# Patient Record
Sex: FEMALE | Race: WHITE
[De-identification: names, ages, dates, MRNs, and addresses within clinical notes are randomized per-mention and may not be internally consistent; named-entity substitution may affect disease eponyms.]

---

## 2018-01-30 ENCOUNTER — HOSPITAL ENCOUNTER (INPATIENT)
Dept: HOSPITAL 17 - NEPD | Age: 53
LOS: 7 days | Discharge: HOME | DRG: 885 | End: 2018-02-06
Attending: PSYCHIATRY & NEUROLOGY | Admitting: PSYCHIATRY & NEUROLOGY
Payer: MEDICAID

## 2018-01-30 VITALS — BODY MASS INDEX: 27.66 KG/M2 | HEIGHT: 63 IN | WEIGHT: 156.09 LBS

## 2018-01-30 VITALS
TEMPERATURE: 98.2 F | DIASTOLIC BLOOD PRESSURE: 105 MMHG | RESPIRATION RATE: 18 BRPM | HEART RATE: 97 BPM | SYSTOLIC BLOOD PRESSURE: 126 MMHG | OXYGEN SATURATION: 98 %

## 2018-01-30 VITALS
SYSTOLIC BLOOD PRESSURE: 123 MMHG | RESPIRATION RATE: 18 BRPM | TEMPERATURE: 98.4 F | DIASTOLIC BLOOD PRESSURE: 66 MMHG | HEART RATE: 88 BPM

## 2018-01-30 VITALS
HEART RATE: 89 BPM | RESPIRATION RATE: 18 BRPM | SYSTOLIC BLOOD PRESSURE: 128 MMHG | DIASTOLIC BLOOD PRESSURE: 75 MMHG | OXYGEN SATURATION: 97 %

## 2018-01-30 DIAGNOSIS — F17.210: ICD-10-CM

## 2018-01-30 DIAGNOSIS — E78.5: ICD-10-CM

## 2018-01-30 DIAGNOSIS — Z79.51: ICD-10-CM

## 2018-01-30 DIAGNOSIS — F20.0: Primary | ICD-10-CM

## 2018-01-30 DIAGNOSIS — J45.909: ICD-10-CM

## 2018-01-30 DIAGNOSIS — Z81.8: ICD-10-CM

## 2018-01-30 DIAGNOSIS — N32.81: ICD-10-CM

## 2018-01-30 DIAGNOSIS — Z62.810: ICD-10-CM

## 2018-01-30 DIAGNOSIS — Z79.899: ICD-10-CM

## 2018-01-30 LAB
ALBUMIN SERPL-MCNC: 4.6 GM/DL (ref 3.4–5)
ALP SERPL-CCNC: 88 U/L (ref 45–117)
ALT SERPL-CCNC: 26 U/L (ref 10–53)
AST SERPL-CCNC: 21 U/L (ref 15–37)
BASOPHILS # BLD AUTO: 0 TH/MM3 (ref 0–0.2)
BASOPHILS NFR BLD: 0.4 % (ref 0–2)
BILIRUB SERPL-MCNC: 0.7 MG/DL (ref 0.2–1)
BUN SERPL-MCNC: 9 MG/DL (ref 7–18)
CALCIUM SERPL-MCNC: 8.8 MG/DL (ref 8.5–10.1)
CHLORIDE SERPL-SCNC: 106 MEQ/L (ref 98–107)
CREAT SERPL-MCNC: 1.02 MG/DL (ref 0.5–1)
EOSINOPHIL # BLD: 0 TH/MM3 (ref 0–0.4)
EOSINOPHIL NFR BLD: 0.6 % (ref 0–4)
ERYTHROCYTE [DISTWIDTH] IN BLOOD BY AUTOMATED COUNT: 13.4 % (ref 11.6–17.2)
GFR SERPLBLD BASED ON 1.73 SQ M-ARVRAT: 57 ML/MIN (ref 89–?)
GLUCOSE SERPL-MCNC: 121 MG/DL (ref 74–106)
HCO3 BLD-SCNC: 23.5 MEQ/L (ref 21–32)
HCT VFR BLD CALC: 44.7 % (ref 35–46)
HGB BLD-MCNC: 15.2 GM/DL (ref 11.6–15.3)
LYMPHOCYTES # BLD AUTO: 2.1 TH/MM3 (ref 1–4.8)
LYMPHOCYTES NFR BLD AUTO: 26.1 % (ref 9–44)
MCH RBC QN AUTO: 31.9 PG (ref 27–34)
MCHC RBC AUTO-ENTMCNC: 33.9 % (ref 32–36)
MCV RBC AUTO: 93.9 FL (ref 80–100)
MONOCYTE #: 0.7 TH/MM3 (ref 0–0.9)
MONOCYTES NFR BLD: 9.2 % (ref 0–8)
NEUTROPHILS # BLD AUTO: 5 TH/MM3 (ref 1.8–7.7)
NEUTROPHILS NFR BLD AUTO: 63.7 % (ref 16–70)
PLATELET # BLD: 245 TH/MM3 (ref 150–450)
PMV BLD AUTO: 9.1 FL (ref 7–11)
PROT SERPL-MCNC: 8.7 GM/DL (ref 6.4–8.2)
RBC # BLD AUTO: 4.76 MIL/MM3 (ref 4–5.3)
SODIUM SERPL-SCNC: 140 MEQ/L (ref 136–145)
WBC # BLD AUTO: 7.9 TH/MM3 (ref 4–11)

## 2018-01-30 PROCEDURE — 80307 DRUG TEST PRSMV CHEM ANLYZR: CPT

## 2018-01-30 PROCEDURE — 84100 ASSAY OF PHOSPHORUS: CPT

## 2018-01-30 PROCEDURE — 83036 HEMOGLOBIN GLYCOSYLATED A1C: CPT

## 2018-01-30 PROCEDURE — 84443 ASSAY THYROID STIM HORMONE: CPT

## 2018-01-30 PROCEDURE — 86592 SYPHILIS TEST NON-TREP QUAL: CPT

## 2018-01-30 PROCEDURE — 84550 ASSAY OF BLOOD/URIC ACID: CPT

## 2018-01-30 PROCEDURE — 82607 VITAMIN B-12: CPT

## 2018-01-30 PROCEDURE — 82550 ASSAY OF CK (CPK): CPT

## 2018-01-30 PROCEDURE — 99285 EMERGENCY DEPT VISIT HI MDM: CPT

## 2018-01-30 PROCEDURE — 85025 COMPLETE CBC W/AUTO DIFF WBC: CPT

## 2018-01-30 PROCEDURE — 83735 ASSAY OF MAGNESIUM: CPT

## 2018-01-30 PROCEDURE — 80061 LIPID PANEL: CPT

## 2018-01-30 PROCEDURE — 80053 COMPREHEN METABOLIC PANEL: CPT

## 2018-01-30 PROCEDURE — 82306 VITAMIN D 25 HYDROXY: CPT

## 2018-01-30 NOTE — PD
HPI


Chief Complaint:  psychiatric


Time Seen by Provider:  15:25


Travel History


International Travel<30 days:  No


Contact w/Intl Traveler<30days:  No


Traveled to known affect area:  No





History of Present Illness


HPI


52-year-old female requesting psychiatric evaluation.  Patient has history of 

schizophrenia.  Patient states that she has not taken her medications for the 

past 2 days.  Patient called EMS to get to the hospital.  Patient states that 

she told EMS that she had chest pain however she denied chest pain to me.  

Patient denies any headache.  Patient denies any neck pain.  Patient denies any 

chest pain or shortness of breath.  Patient denies abdominal pain.  Patient 

denies any focal weakness or numbness of extremity.  Patient has history of 

asthma and hyperlipidemia.  Patient denies any alcohol or drug abuse.





PFSH


Past Medical History


Asthma:  Yes


Anxiety:  Yes


Depression:  Yes


High Cholesterol:  Yes


Diabetes:  No


Diminished Hearing:  No


Genitourinary:  Yes (OVERACTIVE BLADDER)


Psychiatric:  Yes


Respiratory:  Yes (ASTHMA)


Immunizations Current:  No


Schizophrenia:  Yes


Menopausal:  Yes


:  4


Para:  0


:  4





Past Surgical History


Genitourinary Surgery:  Yes (MESH-SLING BLADDER )


Other Surgery:  Yes (PLASTIC SURGERY: BREASTS, NOSE, EYELIDS)





Social History


Alcohol Use:  Yes (OCC)


Tobacco Use:  Yes (1ppd)


Substance Use:  Yes





Allergies-Medications


(Allergen,Severity, Reaction):  


Coded Allergies:  


     tramadol (Verified  Allergy, Severe, ITCHING, 18)


Reported Meds & Prescriptions





Reported Meds & Active Scripts


Active


Reported


Zocor (Simvastatin) 5 Mg Tab 5 Mg PO DAILY


Vistaril (Hydroxyzine Pamoate) 25 Mg Cap 25 Mg PO HS


Latuda (Lurasidone) 40 Mg Tab 40 Mg PO DAILY


Effexor (Venlafaxine HCl) 100 Mg Tab 100 Mg PO Q12H


Advair Diskus Inh (Fluticasone-Salmeterol Inh) 250-50 Mcg/Blist Aer 1 Puff INH 

BID


     Rinse mouth after use.








Review of Systems


General / Constitutional:  No: Fever


Eyes:  No: Visual changes


HENT:  No: Headaches


Cardiovascular:  No: Chest Pain or Discomfort


Respiratory:  No: Shortness of Breath


Gastrointestinal:  No: Abdominal Pain


Genitourinary:  No: Dysuria


Musculoskeletal:  No: Pain


Skin:  No Rash


Neurologic:  No: Weakness


Psychiatric:  No: Depression


Endocrine:  No: Polydipsia


Hematologic/Lymphatic:  No: Easy Bruising





Physical Exam


Narrative


GENERAL: Well-nourished, well-developed patient.


SKIN: Focused skin assessment warm/dry.


HEAD: Normocephalic.


EYES: No scleral icterus. No injection or drainage. 


NECK: Supple, trachea midline. No JVD or lymphadenopathy.


CARDIOVASCULAR: Regular rate and rhythm without murmurs, gallops, or rubs. 


RESPIRATORY: Breath sounds equal bilaterally. No accessory muscle use.


GASTROINTESTINAL: Abdomen soft, non-tender, nondistended. 


MUSCULOSKELETAL: No cyanosis, or edema. 


BACK: Nontender without obvious deformity. No CVA tenderness. 


Neurologic exam normal.





Data


Data


Last Documented VS





Vital Signs








  Date Time  Temp Pulse Resp B/P (MAP) Pulse Ox O2 Delivery O2 Flow Rate FiO2


 


18 15:18 98.2 97 18 126/105 (112) 98   








Orders





 Orders


Complete Blood Count With Diff (18 15:33)


Comprehensive Metabolic Panel (18 15:33)


Thyroid Stimulating Hormone (18 15:33)


Psych Screen (18 15:33)


Drug Screen, Random Urine (18 15:33)


Electrocardiogram (18 15:34)





Labs





Laboratory Tests








Test


  18


15:50


 


White Blood Count 7.9 TH/MM3 


 


Red Blood Count 4.76 MIL/MM3 


 


Hemoglobin 15.2 GM/DL 


 


Hematocrit 44.7 % 


 


Mean Corpuscular Volume 93.9 FL 


 


Mean Corpuscular Hemoglobin 31.9 PG 


 


Mean Corpuscular Hemoglobin


Concent 33.9 % 


 


 


Red Cell Distribution Width 13.4 % 


 


Platelet Count 245 TH/MM3 


 


Mean Platelet Volume 9.1 FL 


 


Neutrophils (%) (Auto) 63.7 % 


 


Lymphocytes (%) (Auto) 26.1 % 


 


Monocytes (%) (Auto) 9.2 % 


 


Eosinophils (%) (Auto) 0.6 % 


 


Basophils (%) (Auto) 0.4 % 


 


Neutrophils # (Auto) 5.0 TH/MM3 


 


Lymphocytes # (Auto) 2.1 TH/MM3 


 


Monocytes # (Auto) 0.7 TH/MM3 


 


Eosinophils # (Auto) 0.0 TH/MM3 


 


Basophils # (Auto) 0.0 TH/MM3 


 


CBC Comment DIFF FINAL 


 


Differential Comment  


 


Blood Urea Nitrogen 9 MG/DL 


 


Creatinine 1.02 MG/DL 


 


Random Glucose 121 MG/DL 


 


Total Protein 8.7 GM/DL 


 


Albumin 4.6 GM/DL 


 


Calcium Level 8.8 MG/DL 


 


Alkaline Phosphatase 88 U/L 


 


Aspartate Amino Transf


(AST/SGOT) 21 U/L 


 


 


Alanine Aminotransferase


(ALT/SGPT) 26 U/L 


 


 


Total Bilirubin 0.7 MG/DL 


 


Sodium Level 140 MEQ/L 


 


Potassium Level 3.9 MEQ/L 


 


Chloride Level 106 MEQ/L 


 


Carbon Dioxide Level 23.5 MEQ/L 


 


Anion Gap 11 MEQ/L 


 


Estimat Glomerular Filtration


Rate 57 ML/MIN 


 


 


Thyroid Stimulating Hormone


3rd Gen 1.340 uIU/ML 


 


 


Urine Opiates Screen NEG 


 


Urine Barbiturates Screen NEG 


 


Urine Amphetamines Screen NEG 


 


Urine Benzodiazepines Screen NEG 


 


Urine Cocaine Screen NEG 


 


Urine Cannabinoids Screen NEG 











MDM


Medical Decision Making


Medical Screen Exam Complete:  Yes


Emergency Medical Condition:  Yes


Interpretation(s)


1704 PM.  CBC within normal limit.  Creatinine 1.02.  Urine drug screen 

negative.


Differential Diagnosis


Differential diagnosis including schizophrenia, adjustment disorder, psychosis


Narrative Course


52-year-old female requesting psychiatric evaluation.  History of 

schizophrenia.  1704 PM.  Patient is medically cleared for psychiatric 

evaluation and disposition.











Dominick Martins MD 2018 15:38

## 2018-01-31 VITALS
TEMPERATURE: 98.2 F | RESPIRATION RATE: 16 BRPM | HEART RATE: 85 BPM | SYSTOLIC BLOOD PRESSURE: 143 MMHG | DIASTOLIC BLOOD PRESSURE: 93 MMHG | OXYGEN SATURATION: 97 %

## 2018-01-31 VITALS
OXYGEN SATURATION: 97 % | DIASTOLIC BLOOD PRESSURE: 67 MMHG | HEART RATE: 60 BPM | RESPIRATION RATE: 16 BRPM | SYSTOLIC BLOOD PRESSURE: 97 MMHG

## 2018-01-31 VITALS
SYSTOLIC BLOOD PRESSURE: 121 MMHG | HEART RATE: 97 BPM | RESPIRATION RATE: 18 BRPM | OXYGEN SATURATION: 97 % | DIASTOLIC BLOOD PRESSURE: 77 MMHG

## 2018-01-31 VITALS
RESPIRATION RATE: 18 BRPM | OXYGEN SATURATION: 98 % | SYSTOLIC BLOOD PRESSURE: 112 MMHG | HEART RATE: 89 BPM | DIASTOLIC BLOOD PRESSURE: 76 MMHG

## 2018-02-01 VITALS
RESPIRATION RATE: 16 BRPM | OXYGEN SATURATION: 97 % | SYSTOLIC BLOOD PRESSURE: 115 MMHG | HEART RATE: 67 BPM | TEMPERATURE: 97.8 F | DIASTOLIC BLOOD PRESSURE: 65 MMHG

## 2018-02-01 VITALS
HEART RATE: 93 BPM | DIASTOLIC BLOOD PRESSURE: 74 MMHG | RESPIRATION RATE: 14 BRPM | TEMPERATURE: 97.8 F | SYSTOLIC BLOOD PRESSURE: 112 MMHG

## 2018-02-01 LAB
CHOLEST SERPL-MCNC: 205 MG/DL (ref 120–200)
CHOLESTEROL/ HDL RATIO: 3.35 RATIO
HBA1C MFR BLD: 5.4 % (ref 4.3–6)
HDLC SERPL-MCNC: 61.1 MG/DL (ref 40–60)
LDLC SERPL-MCNC: 119 MG/DL (ref 0–99)
MAGNESIUM SERPL-MCNC: 2.4 MG/DL (ref 1.5–2.5)
PHOSPHATE SERPL-MCNC: 3.6 MG/DL (ref 2.5–4.9)
TRIGL SERPL-MCNC: 125 MG/DL (ref 42–150)
VIT B12 SERPL-MCNC: 741 PG/ML (ref 193–986)

## 2018-02-01 RX ADMIN — PACLITAXEL SCH MG: 6 INJECTION, SOLUTION, CONCENTRATE INTRAVENOUS at 20:51

## 2018-02-01 RX ADMIN — BUDESONIDE AND FORMOTEROL FUMARATE DIHYDRATE SCH PUFF: 160; 4.5 AEROSOL RESPIRATORY (INHALATION) at 20:54

## 2018-02-01 RX ADMIN — BUDESONIDE AND FORMOTEROL FUMARATE DIHYDRATE SCH PUFF: 160; 4.5 AEROSOL RESPIRATORY (INHALATION) at 08:18

## 2018-02-01 RX ADMIN — ACETAMINOPHEN PRN MG: 325 TABLET ORAL at 17:00

## 2018-02-01 RX ADMIN — LURASIDONE HYDROCHLORIDE SCH MG: 40 TABLET, FILM COATED ORAL at 14:27

## 2018-02-01 RX ADMIN — VENLAFAXINE SCH MG: 25 TABLET ORAL at 20:52

## 2018-02-01 RX ADMIN — BUDESONIDE AND FORMOTEROL FUMARATE DIHYDRATE SCH PUFF: 160; 4.5 AEROSOL RESPIRATORY (INHALATION) at 20:52
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
## 2018-02-02 VITALS
DIASTOLIC BLOOD PRESSURE: 72 MMHG | SYSTOLIC BLOOD PRESSURE: 111 MMHG | TEMPERATURE: 97.8 F | OXYGEN SATURATION: 98 % | HEART RATE: 90 BPM | RESPIRATION RATE: 16 BRPM

## 2018-02-02 VITALS
OXYGEN SATURATION: 97 % | SYSTOLIC BLOOD PRESSURE: 120 MMHG | HEART RATE: 65 BPM | RESPIRATION RATE: 16 BRPM | TEMPERATURE: 98.1 F | DIASTOLIC BLOOD PRESSURE: 71 MMHG

## 2018-02-02 RX ADMIN — LURASIDONE HYDROCHLORIDE SCH MG: 40 TABLET, FILM COATED ORAL at 08:52

## 2018-02-02 RX ADMIN — BUDESONIDE AND FORMOTEROL FUMARATE DIHYDRATE SCH PUFF: 160; 4.5 AEROSOL RESPIRATORY (INHALATION) at 20:27

## 2018-02-02 RX ADMIN — PACLITAXEL SCH MG: 6 INJECTION, SOLUTION, CONCENTRATE INTRAVENOUS at 20:28

## 2018-02-02 RX ADMIN — BUDESONIDE AND FORMOTEROL FUMARATE DIHYDRATE SCH PUFF: 160; 4.5 AEROSOL RESPIRATORY (INHALATION) at 08:54

## 2018-02-02 RX ADMIN — VENLAFAXINE SCH MG: 25 TABLET ORAL at 08:53

## 2018-02-02 RX ADMIN — BUDESONIDE AND FORMOTEROL FUMARATE DIHYDRATE SCH PUFF: 160; 4.5 AEROSOL RESPIRATORY (INHALATION) at 08:53

## 2018-02-02 RX ADMIN — VENLAFAXINE SCH MG: 25 TABLET ORAL at 20:28

## 2018-02-02 NOTE — HHI.PYPN
Subjective


Chief Complaint:  patient psychotic with auditory hallucinations paranoid


Remarks


Patient seen in her room with nurse know, chart review, patient compliant 

medications.  Patient states she is feeling somewhat better.  Now denies voices 

or visions and denies suicidality.  For now continue treatment





Review of Systems


Except as stated in HPI:  all other systems reviewed are Neg





Mental Status Examination


Appearance:  Disheveled


Consciousness:  Alert (mildly)


Orientation:  Person, Place, Date/Time, Situation


Motor Activity:  Normal gait


Speech:  Unremarkable


Language:  Adequate


Fund of Knowledge:  Adequate


Attention and Concentration:  Other (fair)


Memory:  Unremarkable


Affect:  Irritable, Sad


Thought Process & Associations:  Disorganized


Thought Content:  Bizarre thinking


Hallucination Type:  None


Delusion Type:  Paranoid


Suicidal Ideation:  No


Suicidal Plan:  No


Suicidal Intention:  No


Homicidal Ideation:  No


Homicidal Plan:  No


Homicidal Intention:  No


Insight:  Poor


Judgment:  Poor





Results


Vitals/IOs





Vital Signs








  Date Time  Temp Pulse Resp B/P (MAP) Pulse Ox O2 Delivery O2 Flow Rate FiO2


 


2/2/18 06:14 98.1 65 16 120/71 (87) 97   


 


1/31/18 17:46      Room Air  














Intake and Output   


 


 2/2/18 2/2/18 2/3/18





 08:00 16:00 00:00


 


Intake Total  480 ml 


 


Balance  480 ml 











Assessment & Plan


Problem List:  


(1) Schizophrenia


ICD Codes:  F20.9 - Schizophrenia, unspecified


Status:  Chronic


Assessment & Plan


Estimated LOS:  days patient showing some slight improvement.  Compliant 

medication.  She still somewhat vigilant for she does deny voices and 

suicidality.  For now continue treatment


Justification for Cont. Inpt.


At this time patient will decompensate if placed in the lower level of care


Discharge Planning


To be determined


Request HC Surrog/Guard Advoc?:  No





Problem Qualifiers





(1) Schizophrenia:  


Qualified Codes:  F20.0 - Paranoid schizophrenia








Ronald Robertson MD Feb 2, 2018 14:49
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
## 2018-02-03 VITALS
TEMPERATURE: 97.6 F | DIASTOLIC BLOOD PRESSURE: 77 MMHG | RESPIRATION RATE: 18 BRPM | OXYGEN SATURATION: 97 % | HEART RATE: 99 BPM | SYSTOLIC BLOOD PRESSURE: 119 MMHG

## 2018-02-03 VITALS
HEART RATE: 100 BPM | OXYGEN SATURATION: 99 % | RESPIRATION RATE: 15 BRPM | TEMPERATURE: 98.3 F | SYSTOLIC BLOOD PRESSURE: 112 MMHG | DIASTOLIC BLOOD PRESSURE: 63 MMHG

## 2018-02-03 RX ADMIN — ACETAMINOPHEN PRN MG: 325 TABLET ORAL at 09:30

## 2018-02-03 RX ADMIN — BUDESONIDE AND FORMOTEROL FUMARATE DIHYDRATE SCH PUFF: 160; 4.5 AEROSOL RESPIRATORY (INHALATION) at 09:00

## 2018-02-03 RX ADMIN — VENLAFAXINE SCH MG: 25 TABLET ORAL at 22:07

## 2018-02-03 RX ADMIN — VENLAFAXINE SCH MG: 25 TABLET ORAL at 08:52

## 2018-02-03 RX ADMIN — PACLITAXEL SCH MG: 6 INJECTION, SOLUTION, CONCENTRATE INTRAVENOUS at 22:08

## 2018-02-03 RX ADMIN — BUDESONIDE AND FORMOTEROL FUMARATE DIHYDRATE SCH PUFF: 160; 4.5 AEROSOL RESPIRATORY (INHALATION) at 21:00

## 2018-02-03 RX ADMIN — LURASIDONE HYDROCHLORIDE SCH MG: 40 TABLET, FILM COATED ORAL at 08:52
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
## 2018-02-04 VITALS
HEART RATE: 72 BPM | DIASTOLIC BLOOD PRESSURE: 72 MMHG | OXYGEN SATURATION: 98 % | RESPIRATION RATE: 18 BRPM | SYSTOLIC BLOOD PRESSURE: 125 MMHG | TEMPERATURE: 98.8 F

## 2018-02-04 VITALS
HEART RATE: 92 BPM | RESPIRATION RATE: 17 BRPM | DIASTOLIC BLOOD PRESSURE: 80 MMHG | SYSTOLIC BLOOD PRESSURE: 137 MMHG | OXYGEN SATURATION: 98 % | TEMPERATURE: 97.3 F

## 2018-02-04 RX ADMIN — VENLAFAXINE SCH MG: 25 TABLET ORAL at 09:00

## 2018-02-04 RX ADMIN — BUDESONIDE AND FORMOTEROL FUMARATE DIHYDRATE SCH PUFF: 160; 4.5 AEROSOL RESPIRATORY (INHALATION) at 21:45

## 2018-02-04 RX ADMIN — LURASIDONE HYDROCHLORIDE SCH MG: 40 TABLET, FILM COATED ORAL at 09:00

## 2018-02-04 RX ADMIN — PACLITAXEL SCH MG: 6 INJECTION, SOLUTION, CONCENTRATE INTRAVENOUS at 21:08

## 2018-02-04 RX ADMIN — VENLAFAXINE SCH MG: 25 TABLET ORAL at 21:08

## 2018-02-04 RX ADMIN — BUDESONIDE AND FORMOTEROL FUMARATE DIHYDRATE SCH PUFF: 160; 4.5 AEROSOL RESPIRATORY (INHALATION) at 09:00

## 2018-02-04 RX ADMIN — NICOTINE SCH PATCH: 14 PATCH, EXTENDED RELEASE TOPICAL at 14:00

## 2018-02-04 NOTE — HHI.PYPN
Subjective


Chief Complaint:  patient psychotic with auditory hallucinations paranoid


Remarks


Patient was seen and case discussed with nursing.  Patient is rather concrete 

with a bizarre thought process.  She is perseverant on smoking when she leaves.

  She denies auditory visual hallucinations.  Behaving well on the unit





Mental Status Examination


Appearance:  Disheveled


Consciousness:  Alert


Orientation:  Person, Place, Date/Time


Motor Activity:  Other (No motor abnormalities noted.)


Speech:  Unremarkable


Language:  Adequate


Fund of Knowledge:  Adequate


Attention and Concentration:  Other (fair)


Memory:  Unremarkable


Mood:  Other (Depressed)


Affect:  Blunt


Thought Process & Associations:  Tangential


Thought Content:  Bizarre thinking, Delusional


Hallucination Type:  None


Delusion Type:  Paranoid


Suicidal Ideation:  No


Suicidal Plan:  No


Suicidal Intention:  No


Homicidal Ideation:  No


Homicidal Plan:  No


Homicidal Intention:  No


Insight:  Poor


Judgment:  Poor





Results


Vitals/IOs





Vital Signs








  Date Time  Temp Pulse Resp B/P (MAP) Pulse Ox O2 Delivery O2 Flow Rate FiO2


 


2/4/18 06:07 98.8 72 18 125/72 (89) 98   


 


1/31/18 17:46      Room Air  














Intake and Output   


 


 2/4/18 2/4/18 2/5/18





 08:00 16:00 00:00


 


Intake Total  240 ml 


 


Balance  240 ml 











Assessment & Plan


Problem List:  


(1) Schizophrenia


ICD Codes:  F20.9 - Schizophrenia, unspecified


Status:  Chronic


Assessment & Plan


Nicotine patch


Justification for Cont. Inpt.


Patient would decompensate in a less restrictive setting


Request HC Surrog/Guard Advoc?:  No





Problem Qualifiers





(1) Schizophrenia:  


Qualified Codes:  F20.0 - Paranoid schizophrenia








Mike Adams DO Feb 4, 2018 13:04

## 2018-02-05 VITALS
DIASTOLIC BLOOD PRESSURE: 65 MMHG | HEART RATE: 69 BPM | SYSTOLIC BLOOD PRESSURE: 113 MMHG | TEMPERATURE: 97.7 F | RESPIRATION RATE: 18 BRPM | OXYGEN SATURATION: 98 %

## 2018-02-05 VITALS
HEART RATE: 93 BPM | SYSTOLIC BLOOD PRESSURE: 105 MMHG | TEMPERATURE: 98 F | OXYGEN SATURATION: 98 % | DIASTOLIC BLOOD PRESSURE: 75 MMHG | RESPIRATION RATE: 16 BRPM

## 2018-02-05 RX ADMIN — NICOTINE SCH PATCH: 14 PATCH, EXTENDED RELEASE TOPICAL at 08:13

## 2018-02-05 RX ADMIN — PACLITAXEL SCH MG: 6 INJECTION, SOLUTION, CONCENTRATE INTRAVENOUS at 21:50

## 2018-02-05 RX ADMIN — LURASIDONE HYDROCHLORIDE SCH MG: 40 TABLET, FILM COATED ORAL at 08:13

## 2018-02-05 RX ADMIN — VENLAFAXINE SCH MG: 25 TABLET ORAL at 08:13

## 2018-02-05 RX ADMIN — BUDESONIDE AND FORMOTEROL FUMARATE DIHYDRATE SCH PUFF: 160; 4.5 AEROSOL RESPIRATORY (INHALATION) at 08:13

## 2018-02-05 RX ADMIN — BUDESONIDE AND FORMOTEROL FUMARATE DIHYDRATE SCH PUFF: 160; 4.5 AEROSOL RESPIRATORY (INHALATION) at 21:51

## 2018-02-05 RX ADMIN — VENLAFAXINE SCH MG: 25 TABLET ORAL at 21:50
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
## 2018-02-06 VITALS
RESPIRATION RATE: 16 BRPM | SYSTOLIC BLOOD PRESSURE: 110 MMHG | DIASTOLIC BLOOD PRESSURE: 60 MMHG | TEMPERATURE: 97.8 F | OXYGEN SATURATION: 97 % | HEART RATE: 57 BPM

## 2018-02-06 RX ADMIN — NICOTINE SCH PATCH: 14 PATCH, EXTENDED RELEASE TOPICAL at 08:43

## 2018-02-06 RX ADMIN — VENLAFAXINE SCH MG: 25 TABLET ORAL at 08:43

## 2018-02-06 RX ADMIN — BUDESONIDE AND FORMOTEROL FUMARATE DIHYDRATE SCH PUFF: 160; 4.5 AEROSOL RESPIRATORY (INHALATION) at 08:43

## 2018-02-06 RX ADMIN — LURASIDONE HYDROCHLORIDE SCH MG: 40 TABLET, FILM COATED ORAL at 08:43
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
## 2018-02-21 ENCOUNTER — HOSPITAL ENCOUNTER (EMERGENCY)
Dept: HOSPITAL 17 - NEDAMB | Age: 53
LOS: 1 days | Discharge: HOME | End: 2018-02-22
Payer: COMMERCIAL

## 2018-02-21 VITALS
RESPIRATION RATE: 18 BRPM | OXYGEN SATURATION: 99 % | SYSTOLIC BLOOD PRESSURE: 141 MMHG | TEMPERATURE: 97.9 F | DIASTOLIC BLOOD PRESSURE: 78 MMHG | HEART RATE: 70 BPM

## 2018-02-21 DIAGNOSIS — F32.9: ICD-10-CM

## 2018-02-21 DIAGNOSIS — F17.210: ICD-10-CM

## 2018-02-21 DIAGNOSIS — F20.9: ICD-10-CM

## 2018-02-21 DIAGNOSIS — R11.2: Primary | ICD-10-CM

## 2018-02-21 DIAGNOSIS — E78.00: ICD-10-CM

## 2018-02-21 DIAGNOSIS — R19.7: ICD-10-CM

## 2018-02-21 DIAGNOSIS — F41.9: ICD-10-CM

## 2018-02-21 DIAGNOSIS — J45.909: ICD-10-CM

## 2018-02-21 PROCEDURE — 99284 EMERGENCY DEPT VISIT MOD MDM: CPT

## 2018-02-21 PROCEDURE — 96375 TX/PRO/DX INJ NEW DRUG ADDON: CPT

## 2018-02-21 PROCEDURE — 96374 THER/PROPH/DIAG INJ IV PUSH: CPT

## 2018-02-21 NOTE — PD
HPI


Chief Complaint:  GI Complaint


Time Seen by Provider:  21:46


Travel History


International Travel<30 days:  No


Contact w/Intl Traveler<30days:  No


Traveled to known affect area:  No





History of Present Illness


HPI


52-year-old white female presents to emergency department by EMS for evaluation 

of nausea vomiting and diarrhea.  She states that she started feeling ill 

earlier today.  She felt run down and generalized weakness.  She states that 

she had some pizza earlier this evening followed by vomiting.  She does report 

having some diarrhea throughout the day.  She has just been laying around the 

house.  She denies any fever or chills.  No cough, congestion, dysuria, 

frequency or rashes.  Symptoms are mild now but were more moderate earlier.  

She has a states that she feels somewhat better after vomiting.





PFSH


Past Medical History


Asthma:  Yes


Anxiety:  Yes


Depression:  Yes


Cancer:  No


Cardiovascular Problems:  No


High Cholesterol:  Yes


COPD:  No


Diabetes:  No


Diminished Hearing:  No


Endocrine:  No


Genitourinary:  No


Headaches:  No


Immune Disorder:  No


Musculoskeletal:  No


Neurologic:  No


Psychiatric:  Yes (Schizophrenia)


Reproductive:  No


Respiratory:  Yes (ASTHMA)


Immunizations Current:  No


Schizophrenia:  Yes (schizo-affective)


Seizures:  No


Sleep Apnea:  No


Pregnant?:  Not Pregnant


Menopausal:  Yes


:  4


Para:  0


:  4





Past Surgical History


Abdominal Surgery:  No


Cardiac Surgery:  No


Ear Surgery:  No


Endocrine Surgery:  No


Eye Surgery:  Yes


Genitourinary Surgery:  No


Gynecologic Surgery:  Yes


Oral Surgery:  No


Thoracic Surgery:  No


Other Surgery:  Yes (PLASTIC SURGERY: BREASTS, NOSE, EYELIDS)





Social History


Alcohol Use:  Yes (OCC)


Tobacco Use:  Yes (1ppd)


Substance Use:  No





Allergies-Medications


(Allergen,Severity, Reaction):  


Coded Allergies:  


     tramadol (Verified  Allergy, Severe, ITCHING, 18)


Reported Meds & Prescriptions





Reported Meds & Active Scripts


Active


[Budeson-Formot 160-4.5 Mcg Inh] 60 PUFF Aero 1 Puff INH BID


Vistaril (Hydroxyzine Pamoate) 25 Mg Cap 25 Mg PO HS


Latuda (Lurasidone) 40 Mg Tab 40 Mg PO DAILY


Effexor (Venlafaxine HCl) 100 Mg Tab 100 Mg PO Q12H








Review of Systems


Except as stated in HPI:  all other systems reviewed are Neg





Physical Exam


Narrative


GENERAL:  Well-developed, well-nourished in no apparent distress. Nontoxic 

appearing.


HEAD: Normocephalic, atraumatic.


EYES: Pupils equal round and reactive. Extraocular motions intact. No scleral 

icterus. No injection or drainage. 


ENT: Nose clear. Throat without erythema, tonsillar hypertrophy or exudate. 

Uvula midline. Airway patent.


NECK: Trachea midline. Supple, nontender, moves head freely.  No central bony 

tenderness or spasm.


CARDIOVASCULAR: Regular rate and rhythm without murmurs, gallops, or rubs. 


RESPIRATORY: Clear to auscultation. Breath sounds equal bilaterally. No wheezes

, rales, or rhonchi.  


GASTROINTESTINAL: Abdomen soft, non-tender, nondistended. No hepato-splenomegaly

, or palpable masses. No guarding.


EXTREMITIES: No clubbing, cyanosis, or edema. No joint tenderness.


BACK: Nontender without deformity. No flank tenderness.


NEUROLOGICAL: Awake, alert and oriented x 3 .Cranial nerves grossly intact.  

Motor and sensory grossly within normal limits. Normal speech.





Data


Data


Last Documented VS





Vital Signs








  Date Time  Temp Pulse Resp B/P (MAP) Pulse Ox O2 Delivery O2 Flow Rate FiO2


 


18 21:25 97.9 70 18 141/78 (99) 99   








Orders





 Orders


Iv Access Insert/Monitor (18 21:46)


Sodium Chlor 0.9% 1000 Ml Inj (Ns 1000 M (18 22:00)


Diphenhydramine Inj (Benadryl Inj) (18 22:00)


Metoclopramide Inj (Reglan Inj) (18 22:00)


Hyoscyamine (Levsin) (18 22:00)


Ed Discharge Order (18 00:01)








[facility]


Medical Decision Making


Medical Screen Exam Complete:  Yes


Emergency Medical Condition:  Yes


Medical Record Reviewed:  Yes


Differential Diagnosis


Differential diagnosis: Vomiting, diarrhea, UTI, gastroenteritis


Narrative Course


IV access is obtained.  Patient's given a liter bolus of normal saline, 

Benadryl 50 no grams IV, Reglan 10 mg IV and 2 Levsin 0.125 mg sublingual.





The patient is resting comfortable.  She has had resolution of her nausea, 

vomiting.  She has been able take by mouth fluids without difficulty.





This is vomiting, diarrhea





Diagnosis





 Primary Impression:  


 Vomiting


 Qualified Codes:  R11.2 - Nausea with vomiting, unspecified


 Additional Impression:  


 Diarrhea


 Qualified Codes:  R19.7 - Diarrhea, unspecified


Patient Instructions:  General Instructions





***Additional Instructions:  


Rest.


Increase fluids.


Imodium A-D or Kaopectate for diarrhea.


Zofran for any nausea.


Follow-up with a primary care doctor the next 2 days for recheck.


Return to the ER for any problems.


***Med/Other Pt SpecificInfo:  Prescription(s) given


Disposition:  01 DISCHARGE HOME


Condition:  Stable











Renato Blackmon 2018 21:57